# Patient Record
Sex: FEMALE | Race: WHITE | NOT HISPANIC OR LATINO | Employment: OTHER | URBAN - METROPOLITAN AREA
[De-identification: names, ages, dates, MRNs, and addresses within clinical notes are randomized per-mention and may not be internally consistent; named-entity substitution may affect disease eponyms.]

---

## 2018-03-28 ENCOUNTER — HISTORICAL (OUTPATIENT)
Dept: ADMINISTRATIVE | Facility: HOSPITAL | Age: 38
End: 2018-03-28

## 2021-02-23 ENCOUNTER — HISTORICAL (OUTPATIENT)
Dept: ADMINISTRATIVE | Facility: HOSPITAL | Age: 41
End: 2021-02-23

## 2021-02-23 LAB
ABS NEUT (OLG): 3.6 X10(3)/MCL (ref 2.1–9.2)
ALBUMIN SERPL-MCNC: 4.2 GM/DL (ref 3.4–5)
ALBUMIN/GLOB SERPL: 1.75 {RATIO} (ref 1.5–2.5)
ALP SERPL-CCNC: 32 UNIT/L (ref 38–126)
ALT SERPL-CCNC: 8 UNIT/L (ref 7–52)
AST SERPL-CCNC: 13 UNIT/L (ref 15–37)
BILIRUB SERPL-MCNC: 0.7 MG/DL (ref 0.2–1)
BILIRUBIN DIRECT+TOT PNL SERPL-MCNC: 0.2 MG/DL (ref 0–0.5)
BILIRUBIN DIRECT+TOT PNL SERPL-MCNC: 0.5 MG/DL
BUN SERPL-MCNC: 12 MG/DL (ref 7–18)
CALCIUM SERPL-MCNC: 8.8 MG/DL (ref 8.5–10)
CHLORIDE SERPL-SCNC: 96 MMOL/L (ref 98–107)
CO2 SERPL-SCNC: 27 MMOL/L (ref 21–32)
CREAT SERPL-MCNC: 0.88 MG/DL (ref 0.6–1.3)
DEPRECATED CALCIDIOL+CALCIFEROL SERPL-MC: 30.6 NG/ML (ref 30–80)
ERYTHROCYTE [DISTWIDTH] IN BLOOD BY AUTOMATED COUNT: 11.3 % (ref 11.5–17)
GLOBULIN SER-MCNC: 2.4 GM/DL (ref 1.2–3)
GLUCOSE SERPL-MCNC: 84 MG/DL (ref 74–106)
HCT VFR BLD AUTO: 37.4 % (ref 37–47)
HGB BLD-MCNC: 12.5 GM/DL (ref 12–16)
LYMPHOCYTES # BLD AUTO: 1.9 X10(3)/MCL (ref 0.6–3.4)
LYMPHOCYTES NFR BLD AUTO: 32.4 % (ref 13–40)
MCH RBC QN AUTO: 30.5 PG (ref 27–31.2)
MCHC RBC AUTO-ENTMCNC: 33 GM/DL (ref 32–36)
MCV RBC AUTO: 91 FL (ref 80–94)
MONOCYTES # BLD AUTO: 0.5 X10(3)/MCL (ref 0.1–1.3)
MONOCYTES NFR BLD AUTO: 8.4 % (ref 0.1–24)
NEUTROPHILS NFR BLD AUTO: 59.2 % (ref 47–80)
PLATELET # BLD AUTO: 195 X10(3)/MCL (ref 130–400)
PMV BLD AUTO: 10 FL (ref 9.4–12.4)
POTASSIUM SERPL-SCNC: 4 MMOL/L (ref 3.5–5.1)
PROT SERPL-MCNC: 6.6 GM/DL (ref 6.4–8.2)
RBC # BLD AUTO: 4.1 X10(6)/MCL (ref 4.2–5.4)
SODIUM SERPL-SCNC: 131 MMOL/L (ref 136–145)
TSH SERPL-ACNC: 0.42 MIU/ML (ref 0.35–4.94)
VIT B12 SERPL-MCNC: >2000 PG/ML (ref 213–816)
WBC # SPEC AUTO: 6 X10(3)/MCL (ref 4.5–11.5)

## 2021-10-26 ENCOUNTER — HISTORICAL (OUTPATIENT)
Dept: URGENT CARE | Facility: CLINIC | Age: 41
End: 2021-10-26

## 2021-10-26 LAB — SARS-COV-2 RNA RESP QL NAA+PROBE: NOT DETECTED

## 2022-04-10 ENCOUNTER — HISTORICAL (OUTPATIENT)
Dept: ADMINISTRATIVE | Facility: HOSPITAL | Age: 42
End: 2022-04-10

## 2022-04-25 VITALS
HEIGHT: 65 IN | DIASTOLIC BLOOD PRESSURE: 72 MMHG | WEIGHT: 149.94 LBS | SYSTOLIC BLOOD PRESSURE: 106 MMHG | BODY MASS INDEX: 24.98 KG/M2 | OXYGEN SATURATION: 100 %

## 2022-05-03 NOTE — HISTORICAL [facility]ER
This is a historical note converted from [place]. Formatting and pictures may have been removed.  Please reference [facility]on for original formatting and attached multimedia. Chief Complaint  lower back pain  History of Present Illness  Patient has had recurrent?upper thoracic stiffness and myalgias?for many years. ?Associated with lower back discomfort?at times. ?Denies any radicular symptoms.? Typically has a massage every few weeks to help resolve this. ?She had had a massage in 3 weeks?and noted that the symptoms were increasing.? Had a massage done on Saturday awoke on Sunday with?intense?lower back pain and stiffness. ?Denies any radicular symptoms, weakness, numbness, bowel or bladder incontinence.  Review of Systems  Constitutional:?No weight loss, no fever, no fatigue, no chills, no night sweats,?no weakness  Eyes:?No blurred vision,?no redness,?no drainage,?no ocular?pain  HEENT:?No sore throat,?no ear pain, no sinus pressure, no nasal congestion, no rhinorrhea, no postnasal drip  Respiratory:?No cough, no wheezing, no sputum production, no shortness of breath  Cardiovascular:?No chest pain, no palpitations, no dyspnea on exertion,?no orthopnea  Gastrointestinal:?No nausea, no vomiting, no abdominal pain, no diarrhea,?no constipation, no melena,?no hematochezia  Genitourinary:?No dysuria, no hematuria, no frequency, no urgency, no incontinence, no discharge  Musculoskeletal:?myalgias, no arthralgias, no weakness, no joint effusion, no edema  Integumentary:?No rashes, no hives, no itching, no lesions, no jaundice  Neurologic:?No headaches, no numbness, no tingling, no weakness, no dizziness  Psychiatric:?No anxiety, no irritability, no depression,?no suicidal ideations, no?homicidal ideations,?no delusions, no hallucinations  Endocrine:?No polyuria, no polydipsia, no polyphagia  Hematology:?No bruising, no lymphadenopathy,?no paleness  ?  Physical Exam  Vitals & Measurements  HR:?70(Peripheral)?  BP:?108/82?  HT:?165.1?cm? HT:?165.1?cm? WT:?66.5?kg? WT:?66.5?kg? BMI:?24.4?  General:?Well developed, Well-nourished, in No acute distress, A&O x 4  Eye:?PERRLA, EOMI, Clear conjunctiva, Eyelids unremarkable  Ears:?Bilateral EAC clear?and?Bilateral TM clear  Nose:? Mucosa normal, No rhinorrhea, No lesions  O/P:??No?erythema,?No tonsillar hypertrophy,?No tonsillar exudates,?No cobblestoning  Neck:?Soft, Nontender, No thyromegaly, Full range of motion, No cervical lymphadenopathy, No JVD  Cardiovascular:?Regular rate and rhythm, No murmurs, No gallops, No rubs  Respiratory:?Clear to auscultation bilaterally, No wheezes, No rhonchi, No?crackles  Abdomen:? Soft, Nontender, No hepatosplenomegaly, Normal and equal bowel sounds, No masses, No rebound, No guarding  Musculoskeletal:? tenderness?lumbar paraspinal muscles?bilaterally, FROM, No joint abnormality, No clubbing, No cyanosis,No?edema, negative straight leg raise  Neurologic:? No motor or sensory deficits, Reflexes 2+ throughout, CN II-XII grossly intact  Integumentary:?No rashes or skin lesions  ?  Assessment/Plan  1.?Lumbar strain  ?L-spine x-ray-?unremarkable  Celestone 12 mg IM  Flexeril 10 mg 3 times daily as needed muscle spasm  Start meloxicam 15 mg daily  Norco 7.5 mg every 6 hours as needed pain #30  If symptoms fail to improve recommend PT  Ordered:  Office/Outpatient Visit Level 3 Established [id number] PC, Lumbar strain, [facility], 03/28/18 14:58:00 CDT  ?  Orders:  acetaminophen-HYDROcodone, 1 tab(s), Oral, q6hr, PRN PRN as needed for pain, X 7 day(s), # 30 tab(s), 0 Refill(s), Pharmacy: [facility]/pharmacy #[id number]  cyclobenzaprine, 10 mg = 1 tab(s), Oral, TID, PRN PRN as needed for muscle spasm, Use as needed for muscle spasm/pain, may cause sedation, X 10 day(s), # 30 tab(s), 0 Refill(s), Pharmacy: [facility]/pharmacy #[id number]   Problem List/Past Medical History  Ongoing  Angioedema  Generalized anxiety disorder  Insomnia  Historical  No qualifying  data  Medications  ALPRAZOLAM 0.5 MG TABLET, 0.5 mg= 1 tab(s), Oral, Daily, PRN  cyclobenzaprine 10 mg oral tablet, 10 mg= 1 tab(s), Oral, TID, PRN  EPINEPHRINE 0.3 MG AUTO-INJECT  Norco 7.5 mg-325 mg oral tablet, 1 tab(s), Oral, q6hr, PRN  zolpidem 10 mg oral tablet, 10 mg= 1 tab(s), Oral, Once a day (at bedtime), PRN, 5 refills  Allergies  amoxicillin?(unknown)  ibuprofen?(unknown)  Social History  Alcohol  Current, 03/28/2018  Employment/School  Part time, Work/School description: clean houses., 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 Exercise type: Aerobics, Weight lifting., 03/28/2018  Home/Environment  Lives with Children, Spouse., 03/28/2018  Tobacco  Never smoker, 03/28/2018  Family History  Hypertension.: Father.  Immunizations  Vaccine Date Status   tetanus/diphtheria/pertussis, acel(Tdap) 08/22/2014 Recorded

## 2022-08-21 ENCOUNTER — OFFICE VISIT (OUTPATIENT)
Dept: URGENT CARE | Facility: CLINIC | Age: 42
End: 2022-08-21
Payer: COMMERCIAL

## 2022-08-21 VITALS
SYSTOLIC BLOOD PRESSURE: 105 MMHG | RESPIRATION RATE: 18 BRPM | HEART RATE: 81 BPM | WEIGHT: 175 LBS | OXYGEN SATURATION: 97 % | BODY MASS INDEX: 28.12 KG/M2 | TEMPERATURE: 99 F | DIASTOLIC BLOOD PRESSURE: 75 MMHG | HEIGHT: 66 IN

## 2022-08-21 DIAGNOSIS — J02.9 SORE THROAT: ICD-10-CM

## 2022-08-21 DIAGNOSIS — J02.9 ACUTE PHARYNGITIS, UNSPECIFIED ETIOLOGY: ICD-10-CM

## 2022-08-21 DIAGNOSIS — U07.1 COVID-19: Primary | ICD-10-CM

## 2022-08-21 LAB
CTP QC/QA: YES
CTP QC/QA: YES
S PYO RRNA THROAT QL PROBE: NEGATIVE
SARS-COV-2 RDRP RESP QL NAA+PROBE: POSITIVE

## 2022-08-21 PROCEDURE — U0002 COVID-19 LAB TEST NON-CDC: HCPCS | Mod: QW,,, | Performed by: FAMILY MEDICINE

## 2022-08-21 PROCEDURE — 3008F PR BODY MASS INDEX (BMI) DOCUMENTED: ICD-10-PCS | Mod: CPTII,,, | Performed by: FAMILY MEDICINE

## 2022-08-21 PROCEDURE — U0002: ICD-10-PCS | Mod: QW,,, | Performed by: FAMILY MEDICINE

## 2022-08-21 PROCEDURE — 3008F BODY MASS INDEX DOCD: CPT | Mod: CPTII,,, | Performed by: FAMILY MEDICINE

## 2022-08-21 PROCEDURE — 87880 STREP A ASSAY W/OPTIC: CPT | Mod: QW,,, | Performed by: FAMILY MEDICINE

## 2022-08-21 PROCEDURE — 3074F PR MOST RECENT SYSTOLIC BLOOD PRESSURE < 130 MM HG: ICD-10-PCS | Mod: CPTII,,, | Performed by: FAMILY MEDICINE

## 2022-08-21 PROCEDURE — 1159F PR MEDICATION LIST DOCUMENTED IN MEDICAL RECORD: ICD-10-PCS | Mod: CPTII,,, | Performed by: FAMILY MEDICINE

## 2022-08-21 PROCEDURE — 1159F MED LIST DOCD IN RCRD: CPT | Mod: CPTII,,, | Performed by: FAMILY MEDICINE

## 2022-08-21 PROCEDURE — 3078F DIAST BP <80 MM HG: CPT | Mod: CPTII,,, | Performed by: FAMILY MEDICINE

## 2022-08-21 PROCEDURE — 1160F PR REVIEW ALL MEDS BY PRESCRIBER/CLIN PHARMACIST DOCUMENTED: ICD-10-PCS | Mod: CPTII,,, | Performed by: FAMILY MEDICINE

## 2022-08-21 PROCEDURE — 99213 PR OFFICE/OUTPT VISIT, EST, LEVL III, 20-29 MIN: ICD-10-PCS | Mod: 25,,, | Performed by: FAMILY MEDICINE

## 2022-08-21 PROCEDURE — 1160F RVW MEDS BY RX/DR IN RCRD: CPT | Mod: CPTII,,, | Performed by: FAMILY MEDICINE

## 2022-08-21 PROCEDURE — 3074F SYST BP LT 130 MM HG: CPT | Mod: CPTII,,, | Performed by: FAMILY MEDICINE

## 2022-08-21 PROCEDURE — 87880 POCT RAPID STREP A: ICD-10-PCS | Mod: QW,,, | Performed by: FAMILY MEDICINE

## 2022-08-21 PROCEDURE — 99213 OFFICE O/P EST LOW 20 MIN: CPT | Mod: 25,,, | Performed by: FAMILY MEDICINE

## 2022-08-21 PROCEDURE — 3078F PR MOST RECENT DIASTOLIC BLOOD PRESSURE < 80 MM HG: ICD-10-PCS | Mod: CPTII,,, | Performed by: FAMILY MEDICINE

## 2022-08-21 RX ORDER — CEPHALEXIN 500 MG/1
500 CAPSULE ORAL EVERY 8 HOURS
Qty: 15 CAPSULE | Refills: 0 | Status: SHIPPED | OUTPATIENT
Start: 2022-08-21 | End: 2022-08-26

## 2022-08-21 NOTE — PATIENT INSTRUCTIONS
With Concern for COVID-19 infection. Swabs obtained today. COVID-19 Test positive.  Strep test negative  Adequate hydration and rest. Monitor the symptoms closely  Recommendation is to follow a timeline quarantine measures per CDC and [facility]  Tylenol as needed for fever, body aches and headache. Antihistamine of choice for congestion.   Robitussin-DM for cough and cold as needed and as directed. Can try vitamin C, zinc 50 mg, vitamin D3 5000 IU for 10 days.  Self care and home quarantine instructions for 10 days since start of symptoms, no fever (100.4 and above) for 24 hours and with improved symptoms can stop home quarantine  Wear a mask, cover your cough and sneeze. Clean any shared surfaces  Call or return to clinic for any questions. ER precautions with any acute change in symptoms. Follow up with primary MD

## 2022-08-21 NOTE — PROGRESS NOTES
"Subjective:       Patient ID: Patsy Miner is a 42 y.o. female.    Vitals:  height is 5' 6" (1.676 m) and weight is 79.4 kg (175 lb). Her temperature is 99 °F (37.2 °C). Her blood pressure is 105/75 and her pulse is 81. Her respiration is 18 and oxygen saturation is 97%.     Chief Complaint: Sore Throat     X thursday- Sore throat, body aches- exposed to covid    [place]:  Patient is seen and evaluated in an limited status for concern for COVID-19. In order to decrease the risk of exposure to the hospital and health care workers, only a limited exam was done.   42-year-old female otherwise healthy present to clinic with concerns of positive exposure to COVID-19 recently.  States since 3 days mainly complains of sore throat, body aches, feverish.  Temp in the clinic 99.  Patient is not vaccinated for COVID-19.  No difficulty swallowing.      Provider Precautions  N95 mask  Gloves  Eye protection- Face Shield    Covid Screening  + confirmation close contact with spouse  No travel to high risk area  No recent testing done    Review of Systems  Constitutional _feverish, body aches, headache  ENMT_sore throat, nasal congestion and postnasal drip  Respiratory_coughing, no shortness of breath or wheezing  Cardiovascular_no chest pain, no palpitations  Gastrointestinal_negative for nausea or vomiting  Integumentary_negative for rash    Objective:      Physical Exam    General : Alert and Oriented, No apparent distress, afebrile, , appears comfortable on the exam table, clear speech and appropriate communication  Neck - supple, anterior cervical lymph nodes enlarged pressure to palpate  HENT : Oropharynx and tonsillar area appears erythematous, swollen, cobblestoning right tonsil   Respiratory : Bilateral equal breath sounds, nonlabored respirations  Cardiovascular : Rate, rhythm regular, normal volume pulse, no murmur  Integumentary : Warm, Dry and no rash    Assessment:       1. COVID-19    2. Sore throat    3. Acute " pharyngitis, unspecified etiology          Plan:       With Concern for COVID-19 infection. Swabs obtained today. COVID-19 Test positive.  Strep test negative  Adequate hydration and rest. Monitor the symptoms closely  Recommendation is to follow a timeline quarantine measures per CDC and [facility]  Tylenol as needed for fever, body aches and headache. Antihistamine of choice for congestion.   Robitussin-DM for cough and cold as needed and as directed. Can try vitamin C, zinc 50 mg, vitamin D3 5000 IU for 10 days.  Self care and home quarantine instructions for 10 days since start of symptoms, no fever (100.4 and above) for 24 hours and with improved symptoms can stop home quarantine  Wear a mask, cover your cough and sneeze. Clean any shared surfaces  Call or return to clinic for any questions. ER precautions with any acute change in symptoms. Follow up with primary MD    COVID-19    Sore throat  -     POCT rapid strep A  -     POCT COVID-19 Rapid Screening    Acute pharyngitis, unspecified etiology  -     cephALEXin (KEFLEX) 500 MG capsule; Take 1 capsule (500 mg total) by mouth every 8 (eight) hours. for 5 days  Dispense: 15 capsule; Refill: 0

## 2022-12-31 ENCOUNTER — HOSPITAL ENCOUNTER (EMERGENCY)
Facility: HOSPITAL | Age: 42
Discharge: HOME | End: 2022-12-31
Attending: EMERGENCY MEDICINE
Payer: COMMERCIAL

## 2022-12-31 VITALS
RESPIRATION RATE: 14 BRPM | TEMPERATURE: 98 F | SYSTOLIC BLOOD PRESSURE: 94 MMHG | HEART RATE: 55 BPM | OXYGEN SATURATION: 98 % | DIASTOLIC BLOOD PRESSURE: 62 MMHG

## 2022-12-31 DIAGNOSIS — R11.2 NAUSEA AND VOMITING, UNSPECIFIED VOMITING TYPE: ICD-10-CM

## 2022-12-31 DIAGNOSIS — R10.32 LLQ ABDOMINAL PAIN: Primary | ICD-10-CM

## 2022-12-31 DIAGNOSIS — F41.9 ANXIETY: ICD-10-CM

## 2022-12-31 LAB
ALBUMIN SERPL-MCNC: 4.9 G/DL (ref 3.5–5)
ALBUMIN/GLOB SERPL: 1.4 RATIO (ref 1.1–2)
ALP SERPL-CCNC: 45 UNIT/L (ref 40–150)
ALT SERPL-CCNC: 7 UNIT/L (ref 0–55)
APPEARANCE UR: CLEAR
AST SERPL-CCNC: 12 UNIT/L (ref 5–34)
B-HCG SERPL QL: NEGATIVE
BACTERIA #/AREA URNS AUTO: ABNORMAL /HPF
BASOPHILS # BLD AUTO: 0.13 X10(3)/MCL (ref 0–0.2)
BASOPHILS NFR BLD AUTO: 1.3 %
BILIRUB UR QL STRIP.AUTO: ABNORMAL MG/DL
BILIRUBIN DIRECT+TOT PNL SERPL-MCNC: 1.4 MG/DL
BUN SERPL-MCNC: 14.6 MG/DL (ref 7–18.7)
CALCIUM SERPL-MCNC: 9.9 MG/DL (ref 8.4–10.2)
CHLORIDE SERPL-SCNC: 101 MMOL/L (ref 98–107)
CO2 SERPL-SCNC: 22 MMOL/L (ref 22–29)
COLOR UR AUTO: ABNORMAL
CREAT SERPL-MCNC: 1.12 MG/DL (ref 0.55–1.02)
EOSINOPHIL # BLD AUTO: 0.09 X10(3)/MCL (ref 0–0.9)
EOSINOPHIL NFR BLD AUTO: 0.9 %
ERYTHROCYTE [DISTWIDTH] IN BLOOD BY AUTOMATED COUNT: 11.9 % (ref 11–14.5)
GFR SERPLBLD CREATININE-BSD FMLA CKD-EPI: >60 MLS/MIN/1.73/M2
GLOBULIN SER-MCNC: 3.5 GM/DL (ref 2.4–3.5)
GLUCOSE SERPL-MCNC: 115 MG/DL (ref 74–100)
GLUCOSE UR QL STRIP.AUTO: NEGATIVE MG/DL
HCT VFR BLD AUTO: 47.9 % (ref 37–47)
HGB BLD-MCNC: 15.7 GM/DL (ref 12–16)
IMM GRANULOCYTES # BLD AUTO: 0.03 X10(3)/MCL (ref 0–0.04)
IMM GRANULOCYTES NFR BLD AUTO: 0.3 %
KETONES UR QL STRIP.AUTO: ABNORMAL MG/DL
LEUKOCYTE ESTERASE UR QL STRIP.AUTO: NEGATIVE UNIT/L
LYMPHOCYTES # BLD AUTO: 1.15 X10(3)/MCL (ref 0.6–4.6)
LYMPHOCYTES NFR BLD AUTO: 11.2 %
MCH RBC QN AUTO: 29.6 PG
MCHC RBC AUTO-ENTMCNC: 32.8 MG/DL (ref 33–36)
MCV RBC AUTO: 90.4 FL (ref 80–94)
MONOCYTES # BLD AUTO: 0.6 X10(3)/MCL (ref 0.1–1.3)
MONOCYTES NFR BLD AUTO: 5.8 %
NEUTROPHILS # BLD AUTO: 8.28 X10(3)/MCL (ref 2.1–9.2)
NEUTROPHILS NFR BLD AUTO: 80.5 %
NITRITE UR QL STRIP.AUTO: NEGATIVE
NRBC BLD AUTO-RTO: 0 % (ref 0–1)
PH UR STRIP.AUTO: 5.5 [PH]
PLATELET # BLD AUTO: 218 X10(3)/MCL (ref 140–371)
PMV BLD AUTO: 11.4 FL (ref 9.4–12.4)
POTASSIUM SERPL-SCNC: 3.7 MMOL/L (ref 3.5–5.1)
PROT SERPL-MCNC: 8.4 GM/DL (ref 6.4–8.3)
PROT UR QL STRIP.AUTO: ABNORMAL MG/DL
RBC # BLD AUTO: 5.3 X10(6)/MCL (ref 4.2–5.4)
RBC #/AREA URNS AUTO: 137 /HPF
RBC UR QL AUTO: ABNORMAL UNIT/L
SODIUM SERPL-SCNC: 139 MMOL/L (ref 136–145)
SP GR UR STRIP.AUTO: 1.02 (ref 1–1.03)
SQUAMOUS #/AREA URNS AUTO: <5 /HPF
UROBILINOGEN UR STRIP-ACNC: 1 MG/DL
WBC # SPEC AUTO: 10.3 X10(3)/MCL (ref 4.5–11.5)
WBC #/AREA URNS AUTO: <5 /HPF

## 2022-12-31 PROCEDURE — 96376 TX/PRO/DX INJ SAME DRUG ADON: CPT

## 2022-12-31 PROCEDURE — 96374 THER/PROPH/DIAG INJ IV PUSH: CPT

## 2022-12-31 PROCEDURE — 63600175 PHARM REV CODE 636 W HCPCS: Performed by: NURSE PRACTITIONER

## 2022-12-31 PROCEDURE — 96375 TX/PRO/DX INJ NEW DRUG ADDON: CPT

## 2022-12-31 PROCEDURE — 85025 COMPLETE CBC W/AUTO DIFF WBC: CPT | Performed by: NURSE PRACTITIONER

## 2022-12-31 PROCEDURE — 63600175 PHARM REV CODE 636 W HCPCS: Performed by: EMERGENCY MEDICINE

## 2022-12-31 PROCEDURE — 81001 URINALYSIS AUTO W/SCOPE: CPT | Performed by: NURSE PRACTITIONER

## 2022-12-31 PROCEDURE — 96361 HYDRATE IV INFUSION ADD-ON: CPT

## 2022-12-31 PROCEDURE — 25000003 PHARM REV CODE 250: Performed by: NURSE PRACTITIONER

## 2022-12-31 PROCEDURE — 25000003 PHARM REV CODE 250: Performed by: EMERGENCY MEDICINE

## 2022-12-31 PROCEDURE — 99285 EMERGENCY DEPT VISIT HI MDM: CPT | Mod: 25

## 2022-12-31 PROCEDURE — 80053 COMPREHEN METABOLIC PANEL: CPT | Performed by: NURSE PRACTITIONER

## 2022-12-31 PROCEDURE — 84703 CHORIONIC GONADOTROPIN ASSAY: CPT | Performed by: NURSE PRACTITIONER

## 2022-12-31 RX ORDER — HYDROCODONE BITARTRATE AND ACETAMINOPHEN 10; 325 MG/1; MG/1
1 TABLET ORAL EVERY 6 HOURS PRN
Qty: 12 TABLET | Refills: 0 | Status: SHIPPED | OUTPATIENT
Start: 2022-12-31 | End: 2023-01-03

## 2022-12-31 RX ORDER — HYDROCODONE BITARTRATE AND ACETAMINOPHEN 10; 325 MG/1; MG/1
1 TABLET ORAL EVERY 6 HOURS PRN
Qty: 12 TABLET | Refills: 0 | Status: SHIPPED | OUTPATIENT
Start: 2022-12-31 | End: 2022-12-31 | Stop reason: SDUPTHER

## 2022-12-31 RX ORDER — ONDANSETRON 4 MG/1
4 TABLET, ORALLY DISINTEGRATING ORAL EVERY 6 HOURS PRN
Qty: 16 TABLET | Refills: 0 | Status: SHIPPED | OUTPATIENT
Start: 2022-12-31 | End: 2023-01-04

## 2022-12-31 RX ORDER — TAMSULOSIN HYDROCHLORIDE 0.4 MG/1
0.4 CAPSULE ORAL DAILY
Qty: 10 CAPSULE | Refills: 0 | Status: SHIPPED | OUTPATIENT
Start: 2022-12-31 | End: 2022-12-31 | Stop reason: SDUPTHER

## 2022-12-31 RX ORDER — ONDANSETRON 2 MG/ML
4 INJECTION INTRAMUSCULAR; INTRAVENOUS
Status: COMPLETED | OUTPATIENT
Start: 2022-12-31 | End: 2022-12-31

## 2022-12-31 RX ORDER — TAMSULOSIN HYDROCHLORIDE 0.4 MG/1
0.4 CAPSULE ORAL DAILY
Qty: 10 CAPSULE | Refills: 0 | Status: SHIPPED | OUTPATIENT
Start: 2022-12-31 | End: 2023-01-26

## 2022-12-31 RX ORDER — ONDANSETRON 2 MG/ML
8 INJECTION INTRAMUSCULAR; INTRAVENOUS EVERY 4 HOURS PRN
Status: DISCONTINUED | OUTPATIENT
Start: 2022-12-31 | End: 2022-12-31 | Stop reason: HOSPADM

## 2022-12-31 RX ORDER — HYDROMORPHONE HYDROCHLORIDE 2 MG/ML
0.5 INJECTION, SOLUTION INTRAMUSCULAR; INTRAVENOUS; SUBCUTANEOUS
Status: COMPLETED | OUTPATIENT
Start: 2022-12-31 | End: 2022-12-31

## 2022-12-31 RX ORDER — SODIUM CHLORIDE 9 MG/ML
1000 INJECTION, SOLUTION INTRAVENOUS
Status: COMPLETED | OUTPATIENT
Start: 2022-12-31 | End: 2022-12-31

## 2022-12-31 RX ORDER — KETOROLAC TROMETHAMINE 30 MG/ML
30 INJECTION, SOLUTION INTRAMUSCULAR; INTRAVENOUS
Status: COMPLETED | OUTPATIENT
Start: 2022-12-31 | End: 2022-12-31

## 2022-12-31 RX ORDER — ONDANSETRON 4 MG/1
4 TABLET, ORALLY DISINTEGRATING ORAL EVERY 6 HOURS PRN
Qty: 16 TABLET | Refills: 0 | Status: SHIPPED | OUTPATIENT
Start: 2022-12-31 | End: 2022-12-31 | Stop reason: SDUPTHER

## 2022-12-31 RX ORDER — HYDROMORPHONE HYDROCHLORIDE 2 MG/ML
1 INJECTION, SOLUTION INTRAMUSCULAR; INTRAVENOUS; SUBCUTANEOUS
Status: COMPLETED | OUTPATIENT
Start: 2022-12-31 | End: 2022-12-31

## 2022-12-31 RX ADMIN — SODIUM CHLORIDE 1000 ML: 9 INJECTION, SOLUTION INTRAVENOUS at 01:12

## 2022-12-31 RX ADMIN — ONDANSETRON 4 MG: 2 INJECTION INTRAMUSCULAR; INTRAVENOUS at 11:12

## 2022-12-31 RX ADMIN — ONDANSETRON 8 MG: 2 INJECTION INTRAMUSCULAR; INTRAVENOUS at 05:12

## 2022-12-31 RX ADMIN — HYDROMORPHONE HYDROCHLORIDE 1 MG: 2 INJECTION INTRAMUSCULAR; INTRAVENOUS; SUBCUTANEOUS at 01:12

## 2022-12-31 RX ADMIN — HYDROMORPHONE HYDROCHLORIDE 1 MG: 2 INJECTION INTRAMUSCULAR; INTRAVENOUS; SUBCUTANEOUS at 03:12

## 2022-12-31 RX ADMIN — HYDROMORPHONE HYDROCHLORIDE 0.5 MG: 2 INJECTION INTRAMUSCULAR; INTRAVENOUS; SUBCUTANEOUS at 05:12

## 2022-12-31 RX ADMIN — KETOROLAC TROMETHAMINE 30 MG: 30 INJECTION, SOLUTION INTRAMUSCULAR; INTRAVENOUS at 01:12

## 2022-12-31 RX ADMIN — SODIUM CHLORIDE 1000 ML: 9 INJECTION, SOLUTION INTRAVENOUS at 11:12

## 2022-12-31 NOTE — ED PROVIDER NOTES
Encounter Date: 2022       History     Chief Complaint   Patient presents with    Abdominal Pain    Vomiting    Nausea     Pt presents c/o LLQ abd pain with n/v. Onset this am.  Denies dysuria/diarrhea/constipation/fever.  On day2 menstrual cycle.      Very pleasant 42-year-old female presents complaining of severe left lower quadrant left side pain onset this morning at 9:00 a.m. vomiting at by 10:00 a.m. has never had pain like this before but when she was pregnant she had some pain they thought maybe it was kidney stones due to pregnancy she was not imaged.    Patient said the pain is severe it is at the same level of her labor pains best she can recall.  Is not associated with any type of improvement if you move around or hold still in fact can not hold still with this pain.  She is not had this pain that she can recall except perhaps when she was pregnant  Pain today is going and coming in cycles.    Past medical history is significant for a severe anxiety disorder.  Some depression.  Generalized anxiety disorder severe.      She sees Dr. Erik Schaefer.    She is currently on a normal menstrual cycle right now.   2 para 2.  Social history  She quit smoking tobacco 5 years ago.  But she does use nicotine gum today.  She does not use alcohol she has prescription for marijuana due to her severe anxiety.  She is self-employed she is  lives home with  and a daughter  Past medical history possible kidney stones while pregnant severe anxiety.  Surgical history none.  Family history mom is alive healthy dad is alive gallbladder surgery got some contrast for a CT scan went to renal failure.  Not on dialysis as of yet.  Has allergies to amoxicillin and ibuprofen ibuprofen allergy is that it irritates her stomach    Review of patient's allergies indicates:   Allergen Reactions    Amoxicillin      Other reaction(s): unknown    Ibuprofen      Other reaction(s): unknown     Past Medical History:    Diagnosis Date    Angio-edema     Hashimoto's disease      Past Surgical History:   Procedure Laterality Date    NO PAST SURGERIES       Family History   Problem Relation Age of Onset    No Known Problems Mother     No Known Problems Father     No Known Problems Sister     No Known Problems Brother     No Known Problems Other      Social History     Tobacco Use    Smoking status: Never    Smokeless tobacco: Current     Types: Chew    Tobacco comments:     nicotine gum   Substance Use Topics    Alcohol use: Not Currently    Drug use: Never     Review of Systems   Constitutional:  Negative for fever.   HENT:  Negative for sore throat.    Eyes: Negative.    Respiratory:  Negative for shortness of breath.    Cardiovascular:  Negative for chest pain.   Gastrointestinal:  Positive for abdominal pain. Negative for nausea.        Severe left lower quadrant pain   Endocrine: Negative.    Genitourinary:  Positive for flank pain. Negative for dysuria.   Musculoskeletal:  Negative for back pain.   Skin:  Negative for rash.   Allergic/Immunologic: Negative.    Neurological:  Negative for weakness.   Hematological:  Does not bruise/bleed easily.   Psychiatric/Behavioral:  The patient is nervous/anxious.    All other systems reviewed and are negative.    Physical Exam     Initial Vitals [12/31/22 1130]   BP Pulse Resp Temp SpO2   124/84 95 16 97.9 °F (36.6 °C) 96 %      MAP       --         Physical Exam    Nursing note and vitals reviewed.  Constitutional: She appears well-developed and well-nourished.   Slender female who is obviously anxious skin is warm and dry she is not appear to be toxic she is standing up on the side of the bed time of fidgety.  Pain is severe currently  Pain has been going and coming in cycles like.   HENT:   Head: Normocephalic and atraumatic.   Right Ear: Tympanic membrane and external ear normal.   Left Ear: Tympanic membrane and external ear normal.   Nose: Nose normal.   Mouth/Throat: Oropharynx is  clear and moist and mucous membranes are normal.   Eyes: EOM are normal. Pupils are equal, round, and reactive to light.   Neck: Neck supple. No thyromegaly present. No tracheal deviation present. No JVD present.   Normal range of motion.  Cardiovascular:  Normal rate, regular rhythm and normal heart sounds.     Exam reveals no gallop and no friction rub.       No murmur heard.  Pulmonary/Chest: Breath sounds normal. No stridor. No respiratory distress. She has no wheezes. She has no rhonchi. She has no rales. She exhibits no tenderness.   Abdominal: Abdomen is soft. Bowel sounds are normal. She exhibits no distension and no mass. There is no abdominal tenderness. There is no rebound and no guarding.   Genitourinary:    Genitourinary Comments: Patient does not have pain in the cervical thoracic or lumbar sacral spine to palpate there is no CVA tenderness pain     Musculoskeletal:         General: No tenderness or edema. Normal range of motion.      Cervical back: Normal range of motion and neck supple.     Lymphadenopathy:     She has no cervical adenopathy.   Neurological: She is alert and oriented to person, place, and time. She has normal strength and normal reflexes. No cranial nerve deficit. GCS score is 15. GCS eye subscore is 4. GCS verbal subscore is 5. GCS motor subscore is 6.   Skin: Skin is warm and dry. Capillary refill takes 2 to 3 seconds. No rash noted.   Psychiatric: She has a normal mood and affect. Her behavior is normal. Judgment and thought content normal.       ED Course   Procedures  Labs Reviewed   COMPREHENSIVE METABOLIC PANEL - Abnormal; Notable for the following components:       Result Value    Glucose Level 115 (*)     Creatinine 1.12 (*)     Protein Total 8.4 (*)     All other components within normal limits   URINALYSIS - Abnormal; Notable for the following components:    Protein, UA Trace (*)     Ketones, UA 1+ (*)     Blood, UA 3+ (*)     Bilirubin, UA 1+ (*)     All other components  within normal limits   CBC WITH DIFFERENTIAL - Abnormal; Notable for the following components:    Hct 47.9 (*)     MCHC 32.8 (*)     All other components within normal limits   URINALYSIS, MICROSCOPIC - Abnormal; Notable for the following components:    RBC,  (*)     All other components within normal limits   HCG, SERUM, QUALITATIVE - Normal   CBC W/ AUTO DIFFERENTIAL    Narrative:     The following orders were created for panel order CBC Auto Differential.  Procedure                               Abnormality         Status                     ---------                               -----------         ------                     CBC with Differential[[id number]]        Abnormal            Final result                 Please view results for these tests on the individual orders.     No results found for this or any previous visit (from the past 5 hour(s)).           Imaging Results              CT Abdomen Pelvis  Without Contrast (Final result)  Result time 12/31/22 16:03:51      Final result by Brent Lane MD (12/31/22 16:03:51)                   Impression:      No definite acute abnormality within the abdomen or pelvis within the limits of a noncontrast exam.  No evidence of renal stone, obstructing renal stone, or secondary signs of renal stone.    Trace inflammatory change of the right colon.  Trace colitis is not entirely excluded.      Electronically signed by: Brent Lane  Date:    12/31/2022  Time:    16:03               Narrative:    EXAMINATION:  CT ABDOMEN PELVIS WITHOUT CONTRAST    CLINICAL HISTORY:  Flank pain, kidney stone suspected;LLQ pain;    TECHNIQUE:  Multidetector non-contrast axial CT images of the abdomen and pelvis were obtained with coronal and sagittal reconstructions.    Automatic exposure control was utilized to reduce the patient's radiation dose.    DLP= 222    COMPARISON:  No prior imaging available for comparison.    FINDINGS:  01. HEPATOBILIARY: No focal hepatic lesion  is identified, however evaluation is limited secondary to lack of IV contrast. The gallbladder is normal.    02. SPLEEN: Normal    03. PANCREAS: No focal masses or ductal dilatation.    04. ADRENALS: No adrenal nodules.    05. KIDNEYS: The right kidney demonstrates no stone, hydronephrosis, or hydroureter. No focal mass identified. The left kidney demonstrates no stone, hydronephrosis, or hydroureter. No focal mass identified.    06. LYMPHADENOPATHY/RETROPERITONEUM: There is no retroperitoneal lymphadenopathy. The abdominal aorta is normal in course and caliber.    07. BOWEL: Trace inflammatory change of the right colon.  No evidence of appendiceal inflammation.    08. PELVIC VISCERA: Normal. No pelvic mass.    09. PELVIC LYMPH NODES: No lymphadenopathy.  Punctate calcifications in the left hemipelvis in less on the right believed to be venous.    10. PERITONEUM/ABDOMINAL WALL: No ascites or implant.    11. SKELETAL: No aggressive appearing lytic/blastic lesion. No acute fractures, subluxations or dislocations.    12. LUNG BASES: The visualized lungs are unremarkable.                                       Medications   ondansetron injection 8 mg (8 mg Intravenous Given 12/31/22 1701)   0.9%  NaCl infusion (1,000 mLs Intravenous New Bag 12/31/22 1145)   ondansetron injection 4 mg (4 mg Intravenous Given 12/31/22 1141)   HYDROmorphone (PF) injection 1 mg (1 mg Intravenous Given 12/31/22 1324)   ketorolac injection 30 mg (30 mg Intravenous Given 12/31/22 1324)   sodium chloride 0.9% bolus 1,000 mL 1,000 mL (0 mLs Intravenous Stopped 12/31/22 1423)   HYDROmorphone (PF) injection 1 mg (1 mg Intravenous Given 12/31/22 1532)   HYDROmorphone (PF) injection 0.5 mg (0.5 mg Intravenous Given 12/31/22 1713)     Medical Decision Making:   Initial Assessment:   Pleasant female anxious severe pain sudden in onset came out the clear blue comes and goes.  Pain so bad makes her throw up.  Possible history kidney stones with pregnancy  today story certainly sounds like kidney stones that she can not find any comfortable position.  Differential Diagnosis:   Kidney stones ovarian cyst diverticulitis of the left colon.  Renal mass  Clinical Tests:   Lab Tests: Ordered and Reviewed  The following lab test(s) were unremarkable: CBC and CMP       <> Summary of Lab: Patient has hematuria but she is on her menstrual cycle however there is a tampon in place so this is possibly real blood from a kidney stone.  Patient's CBC and CMP are unremarkable except for a glucose of 115 a creatinine of 12 of 1.2.  Pregnancy test is negative.  BUN is 14.6    CT scan is ordered and pending to evaluate severe left lower quadrant pain possible ule out kidney stone  Radiological Study: Ordered and Reviewed  ED Management:  I reviewed the CT scan long with the radiologist there numerous pelvic phleboliths down there with the radiologist does not feel these represent kidney stones they do not see an obvious nephrolithiasis ureterolithiasis on the left side her story however her history the physical evaluation and the lack of other findings certainly seem consistent with nephrolithiasis she does have blood in her urine but she is on her.  However she does have a tampon in place.  Unlikely to be bleeding around it I explained this to the patient at length she understands we are going to treated like a kidney stone she is okay with that  I asked the patient to please return and to be watchful for any additional symptoms  Patient understands patient stepped out both times I went in the room I will talk to him when he returns she will have to have a ride home regardless           ED Course as of 12/31/22 1756   Sat Dec 31, 2022   1617 Patient still had a trace of pain a little nausea at this time  has returned I talked to the  at length I did give her a small dose of Dilaudid again and another dose of Zofran she would not received earlier and she is ready for  discharge [DM]      ED Course User Index  [DM] Darien Hernández MD                 Clinical Impression:   Final diagnoses:  [R10.32] LLQ abdominal pain - Story physical evaluation lack of under findings hematuria very consistent with kidney stone (Primary)  [F41.9] Anxiety  [R11.2] Nausea and vomiting, unspecified vomiting type        ED Disposition Condition    Discharge Stable          ED Prescriptions       Medication Sig Dispense Start Date End Date Auth. Provider    HYDROcodone-acetaminophen (NORCO)  mg per tablet  (Status: Discontinued) Take 1 tablet by mouth every 6 (six) hours as needed for Pain (Do not drive after using medication at least 8 hours). 12 tablet 12/31/2022 12/31/2022 Darien Hernández MD    ondansetron (ZOFRAN-ODT) 4 MG TbDL  (Status: Discontinued) Take 1 tablet (4 mg total) by mouth every 6 (six) hours as needed (nausea vomiting). 16 tablet 12/31/2022 12/31/2022 Darien Hernández MD    tamsulosin (FLOMAX) 0.4 mg Cap  (Status: Discontinued) Take 1 capsule (0.4 mg total) by mouth once daily. for 10 days 10 capsule 12/31/2022 12/31/2022 Darien Hernández MD    HYDROcodone-acetaminophen (NORCO)  mg per tablet Take 1 tablet by mouth every 6 (six) hours as needed for Pain (Do not drive after using medication at least 8 hours). 12 tablet 12/31/2022 1/3/2023 Darien Hernández MD    ondansetron (ZOFRAN-ODT) 4 MG TbDL Take 1 tablet (4 mg total) by mouth every 6 (six) hours as needed (nausea vomiting). 16 tablet 12/31/2022 1/4/2023 Darien Hernández MD    tamsulosin (FLOMAX) 0.4 mg Cap Take 1 capsule (0.4 mg total) by mouth once daily. for 10 days 10 capsule 12/31/2022 1/10/2023 Darien Hernández MD          Follow-up Information       Follow up With Specialties Details Why Contact Info    Erik Schaefer Jr., MD Family Medicine In 1 week As needed [address] [place] [id number]  [phone]               Darien Hernández MD  12/31/22 3720       Darien Hernández MD  12/31/22 1640        Darien Hernández MD  12/31/22 8314

## 2022-12-31 NOTE — DISCHARGE INSTRUCTIONS
We do not see a kidney stone on the CT we do not see anything else either.  If your pain gets unbearable if you have fever or chills nausea or vomiting something changes return at any time.    Your story physical evaluation and history are very consistent with a kidney stone but we do not visualize 1 on the CT    Take the medications as prescribed drink lots of water    Return for any emergency problem

## 2022-12-31 NOTE — FIRST PROVIDER EVALUATION
Medical screening examination initiated.  I have conducted a focused provider triage encounter, findings are as follows:    Brief history of present illness:  43y/o F presents to the ED with left lower abdominal pain . Onset 2 hours.     There were no vitals filed for this visit.    Pertinent physical exam:  AAA x 3    Brief workup plan:  Labs    Preliminary workup initiated; this workup will be continued and followed by the physician or advanced practice provider that is assigned to the patient when roomed.

## 2023-01-26 PROBLEM — R46.89 COMPULSIVE BEHAVIOR: Status: ACTIVE | Noted: 2023-01-26

## 2023-01-26 PROBLEM — F41.1 GENERALIZED ANXIETY DISORDER: Status: ACTIVE | Noted: 2023-01-26

## 2023-01-26 PROBLEM — F42.9 OCD (OBSESSIVE COMPULSIVE DISORDER): Status: ACTIVE | Noted: 2023-01-26

## 2023-01-26 PROBLEM — G47.00 INSOMNIA: Status: ACTIVE | Noted: 2023-01-26

## 2023-01-26 PROBLEM — F32.A DEPRESSIVE DISORDER: Status: ACTIVE | Noted: 2023-01-26

## 2024-04-22 PROBLEM — F31.63 BIPOLAR DISORDER, CURRENT EPISODE MIXED, SEVERE: Status: ACTIVE | Noted: 2024-04-22

## 2024-06-20 ENCOUNTER — OFFICE VISIT (OUTPATIENT)
Dept: URGENT CARE | Facility: CLINIC | Age: 44
End: 2024-06-20
Payer: COMMERCIAL

## 2024-06-20 VITALS
HEIGHT: 66 IN | TEMPERATURE: 100 F | BODY MASS INDEX: 22.5 KG/M2 | RESPIRATION RATE: 16 BRPM | WEIGHT: 140 LBS | OXYGEN SATURATION: 100 % | HEART RATE: 74 BPM | DIASTOLIC BLOOD PRESSURE: 76 MMHG | SYSTOLIC BLOOD PRESSURE: 112 MMHG

## 2024-06-20 DIAGNOSIS — R31.9 URINARY TRACT INFECTION WITH HEMATURIA, SITE UNSPECIFIED: Primary | ICD-10-CM

## 2024-06-20 DIAGNOSIS — R35.0 FREQUENT URINATION: ICD-10-CM

## 2024-06-20 DIAGNOSIS — N39.0 URINARY TRACT INFECTION WITH HEMATURIA, SITE UNSPECIFIED: Primary | ICD-10-CM

## 2024-06-20 LAB
BILIRUB UR QL STRIP: NEGATIVE
GLUCOSE UR QL STRIP: NEGATIVE
KETONES UR QL STRIP: NEGATIVE
LEUKOCYTE ESTERASE UR QL STRIP: POSITIVE
PH, POC UA: 7
POC BLOOD, URINE: POSITIVE
POC NITRATES, URINE: NEGATIVE
PROT UR QL STRIP: POSITIVE
SP GR UR STRIP: 1.01 (ref 1–1.03)
UROBILINOGEN UR STRIP-ACNC: 0.1 (ref 0.1–1.1)

## 2024-06-20 RX ORDER — CEFTRIAXONE 1 G/1
1 INJECTION, POWDER, FOR SOLUTION INTRAMUSCULAR; INTRAVENOUS
Status: COMPLETED | OUTPATIENT
Start: 2024-06-20 | End: 2024-06-20

## 2024-06-20 RX ORDER — NITROFURANTOIN 25; 75 MG/1; MG/1
100 CAPSULE ORAL 2 TIMES DAILY
Qty: 14 CAPSULE | Refills: 0 | Status: SHIPPED | OUTPATIENT
Start: 2024-06-20 | End: 2024-06-27

## 2024-06-20 RX ADMIN — CEFTRIAXONE 1 G: 1 INJECTION, POWDER, FOR SOLUTION INTRAMUSCULAR; INTRAVENOUS at 03:06

## 2024-06-20 NOTE — PROGRESS NOTES
"Subjective:      Patient ID: Patsy Miner is a 44 y.o. female.    Vitals:  height is 5' 5.9" (1.674 m) and weight is 63.5 kg (140 lb). Her temperature is 99.7 °F (37.6 °C). Her blood pressure is 112/76 and her pulse is 74. Her respiration is 16 and oxygen saturation is 100%.     Chief Complaint: Urinary Frequency    Patient is a 44 y.o. female who presents to urgent care with complaints of a possible UTI. She is experiencing urinary frequency and urgency, painful urination, strong urine odor x yesterday. Alleviating factors include none. Patient denies fever, abdominal pain, or back pain. Patient reports that she is currently on her menstrual cycle.         Constitution: Negative.   HENT: Negative.     Cardiovascular: Negative.    Eyes: Negative.    Respiratory: Negative.     Gastrointestinal: Negative.    Genitourinary:  Positive for dysuria and frequency.   Musculoskeletal: Negative.    Skin: Negative.    Allergic/Immunologic: Negative.    Neurological: Negative.    Hematologic/Lymphatic: Negative.       Objective:     Physical Exam   Constitutional: She is oriented to person, place, and time. She appears well-developed. She is cooperative.  Non-toxic appearance. She does not appear ill. No distress.   HENT:   Head: Normocephalic and atraumatic.   Eyes: Conjunctivae and lids are normal.   Neck: Trachea normal and phonation normal. Neck supple.   Pulmonary/Chest: Effort normal. No respiratory distress.   Abdominal: Normal appearance. Soft. There is no abdominal tenderness. There is no rebound, no guarding, no left CVA tenderness and no right CVA tenderness.   Neurological: She is alert and oriented to person, place, and time. She exhibits normal muscle tone.   Skin: Skin is warm, dry, intact and not diaphoretic.   Psychiatric: Her speech is normal and behavior is normal. Mood, judgment and thought content normal.   Nursing note and vitals reviewed.         Previous History      Review of patient's allergies " "indicates:   Allergen Reactions    Amoxicillin      Other reaction(s): unknown    Ibuprofen      Other reaction(s): unknown       Past Medical History:   Diagnosis Date    Angio-edema     TAMI (generalized anxiety disorder)     Hashimoto's disease     Insomnia      Current Outpatient Medications   Medication Instructions    ALPRAZolam (XANAX) 0.5 mg, Oral, Daily PRN    doxepin (SINEQUAN) 150 mg, Oral, Nightly    EPINEPHrine (EPIPEN 2-TIFFANI) 0.3 mg, Intramuscular, Once    lamoTRIgine (LAMICTAL) 100 MG tablet TAKE ONE TABLET BY MOUTH EVERY DAY (TAKE WITH 25 MG TO EQUAL 125 MG DAILY)    lamoTRIgine (LAMICTAL) 25 MG tablet TAKE ONE TABLET BY MOUTH EVERY DAY (TAKE WITH 100 MG TO EQUAL 125 MG DAILY)    nitrofurantoin, macrocrystal-monohydrate, (MACROBID) 100 MG capsule 100 mg, Oral, 2 times daily    propranoloL (INDERAL) 10 mg, Oral    VRAYLAR 1.5 mg, Oral     Past Surgical History:   Procedure Laterality Date    NO PAST SURGERIES       Family History   Problem Relation Name Age of Onset    No Known Problems Mother      No Known Problems Father      No Known Problems Brother      No Known Problems Other         Social History     Tobacco Use    Smoking status: Never    Smokeless tobacco: Current     Types: Chew    Tobacco comments:     nicotine gum   Substance Use Topics    Alcohol use: Not Currently    Drug use: Yes     Types: Marijuana        Physical Exam      Vital Signs Reviewed   /76   Pulse 74   Temp 99.7 °F (37.6 °C)   Resp 16   Ht 5' 5.9" (1.674 m)   Wt 63.5 kg (140 lb)   LMP 06/20/2024 (Exact Date)   SpO2 100%   BMI 22.67 kg/m²        Procedures    Procedures     Labs     Results for orders placed or performed in visit on 06/20/24   POCT Urinalysis, Dipstick, Manual, w/o Scope   Result Value Ref Range    POC Blood, Urine Positive (A) Negative    POC Bilirubin, Urine Negative Negative    POC Urobilinogen, Urine 0.1 0.1 - 1.1    POC Ketones, Urine Negative Negative    POC Protein, Urine Positive (A) " Negative    POC Nitrates, Urine Negative Negative    POC Glucose, Urine Negative Negative    pH, UA 7     POC Specific Gravity, Urine 1.010 1.003 - 1.029    POC Leukocytes, Urine Positive (A) Negative       Assessment:     1. Urinary tract infection with hematuria, site unspecified    2. Frequent urination        Plan:   Medication sent to pharmacy.  We will culture urine and call you with the results when they become available.  Monitor for fever.  Hydrate.  If your symptoms persist or worsen or you develop fever back pain or belly pain return to clinic or seek medical attention immediately      Urinary tract infection with hematuria, site unspecified  -     Urine culture    Frequent urination  -     POCT Urinalysis, Dipstick, Manual, w/o Scope    Other orders  -     cefTRIAXone injection 1 g  -     nitrofurantoin, macrocrystal-monohydrate, (MACROBID) 100 MG capsule; Take 1 capsule (100 mg total) by mouth 2 (two) times daily. for 7 days  Dispense: 14 capsule; Refill: 0

## 2024-06-22 ENCOUNTER — TELEPHONE (OUTPATIENT)
Dept: URGENT CARE | Facility: CLINIC | Age: 44
End: 2024-06-22
Payer: COMMERCIAL

## 2024-06-22 LAB — BACTERIA UR CULT: ABNORMAL

## 2024-06-22 NOTE — TELEPHONE ENCOUNTER
Spoke with pt and pt explained that she is vomiting a lot and has been having trouble keeping anything down, also states that she is having kidney pain. I explained to pt that she should come back into be reevaluated due to new and worsening symptoms but did explain to pt that if provider thinks symptoms are bad enough she may be sent to ER and she verbalized understanding